# Patient Record
Sex: MALE | Race: WHITE | HISPANIC OR LATINO | Employment: FULL TIME | URBAN - METROPOLITAN AREA
[De-identification: names, ages, dates, MRNs, and addresses within clinical notes are randomized per-mention and may not be internally consistent; named-entity substitution may affect disease eponyms.]

---

## 2021-04-14 ENCOUNTER — OFFICE VISIT (OUTPATIENT)
Dept: URGENT CARE | Facility: PHYSICIAN GROUP | Age: 41
End: 2021-04-14

## 2021-04-14 VITALS
WEIGHT: 186 LBS | HEART RATE: 96 BPM | OXYGEN SATURATION: 98 % | BODY MASS INDEX: 29.89 KG/M2 | RESPIRATION RATE: 16 BRPM | DIASTOLIC BLOOD PRESSURE: 72 MMHG | HEIGHT: 66 IN | SYSTOLIC BLOOD PRESSURE: 130 MMHG | TEMPERATURE: 97.1 F

## 2021-04-14 DIAGNOSIS — L02.419 AXILLARY ABSCESS: ICD-10-CM

## 2021-04-14 PROCEDURE — 10061 I&D ABSCESS COMP/MULTIPLE: CPT | Performed by: NURSE PRACTITIONER

## 2021-04-14 RX ORDER — SULFAMETHOXAZOLE AND TRIMETHOPRIM 800; 160 MG/1; MG/1
1 TABLET ORAL 2 TIMES DAILY
Qty: 20 TABLET | Refills: 0 | Status: SHIPPED | OUTPATIENT
Start: 2021-04-14 | End: 2021-04-24

## 2021-04-14 NOTE — PROGRESS NOTES
Subjective:      Sukhdeep Duque AngelFung is a 40 y.o. male who presents with Bump (3 lumps in R under arm x3days)    History reviewed. No pertinent past medical history.  Social History     Socioeconomic History   • Marital status:      Spouse name: Not on file   • Number of children: Not on file   • Years of education: Not on file   • Highest education level: Not on file   Occupational History   • Not on file   Tobacco Use   • Smoking status: Never Smoker   • Smokeless tobacco: Never Used   Substance and Sexual Activity   • Alcohol use: Yes     Comment: occ   • Drug use: Not on file   • Sexual activity: Not on file   Other Topics Concern   • Not on file   Social History Narrative   • Not on file     Social Determinants of Health     Financial Resource Strain:    • Difficulty of Paying Living Expenses:    Food Insecurity:    • Worried About Running Out of Food in the Last Year:    • Ran Out of Food in the Last Year:    Transportation Needs:    • Lack of Transportation (Medical):    • Lack of Transportation (Non-Medical):    Physical Activity:    • Days of Exercise per Week:    • Minutes of Exercise per Session:    Stress:    • Feeling of Stress :    Social Connections:    • Frequency of Communication with Friends and Family:    • Frequency of Social Gatherings with Friends and Family:    • Attends Church Services:    • Active Member of Clubs or Organizations:    • Attends Club or Organization Meetings:    • Marital Status:    Intimate Partner Violence:    • Fear of Current or Ex-Partner:    • Emotionally Abused:    • Physically Abused:    • Sexually Abused:      History reviewed. No pertinent family history.    Allergies: Patient has no known allergies.    Patient is a 40-year-old male who presents today with complaint of 3 large painful lumps in the right axilla.  Patient states he sometimes gets small infected hair follicles, however this time he noticed a larger area and states he and his wife have  "tried to pop the area and expectorate pus last night.  He states they were able to get some drainage from it.  He is also noted to other painful masses.  He states these are painful enough to keep him from sleeping at night.  He states no fever, aches, or chills.  States he has not had a history of developing abscesses previously.        Other  This is a new problem. The current episode started in the past 7 days. The problem occurs constantly. The problem has been unchanged. Nothing aggravates the symptoms. He has tried nothing for the symptoms. The treatment provided no relief.       Review of Systems   All other systems reviewed and are negative.         Objective:     /72   Pulse 96   Temp 36.2 °C (97.1 °F) (Temporal)   Resp 16   Ht 1.676 m (5' 6\")   Wt 84.4 kg (186 lb)   SpO2 98%   BMI 30.02 kg/m²      Physical Exam  Vitals reviewed.   Constitutional:       Appearance: Normal appearance.   Cardiovascular:      Rate and Rhythm: Normal rate and regular rhythm.      Pulses: Normal pulses.      Heart sounds: Normal heart sounds.   Pulmonary:      Effort: Pulmonary effort is normal.      Breath sounds: Normal breath sounds.   Musculoskeletal:      Cervical back: Normal range of motion and neck supple.   Skin:     General: Skin is warm and dry.      Capillary Refill: Capillary refill takes less than 2 seconds.      Comments: There are 3 separate and distinct areas of induration in the right axilla, each measuring 3 cm in diameter.  Mild fluctuance palpable to each.   Neurological:      Mental Status: He is alert and oriented to person, place, and time.   Psychiatric:         Mood and Affect: Mood normal.         Behavior: Behavior normal.         Thought Content: Thought content normal.         Judgment: Judgment normal.       Procedure:    All 3 areas were cleaned with Betadine and then infiltrated with 2% lidocaine, 1.5 mL to each.  Good anesthesia achieved.  An 11 blade scalpel was used to make a 2 cm " incision across the top of each abscess.  Moderate amount of purulent drainage was expectorated from each.  Each wound was probed with sterile hemostats to break up any loculations and each was irrigated with sterile saline.  Each abscess was then packed with sterile iodoform ribbon.  Bleeding was controlled throughout.  Dressing placed over the top of the area with a large nonstick dressing with gauze and surgical tape over the top.  Patient was advised to return to clinic in 2 days for recheck, sooner if dressing becomes soiled.          Assessment/Plan:   Abscess x3, right axilla    Keep dressing intact, clean, and dry  Return in 24 to 48 hours for dressing change and wound repack  Bactrim DS called to patient's pharmacy  Ibuprofen as needed for pain  Return to urgent care otherwise for any further questions or concerns       There are no diagnoses linked to this encounter.

## 2021-04-16 ENCOUNTER — OFFICE VISIT (OUTPATIENT)
Dept: URGENT CARE | Facility: PHYSICIAN GROUP | Age: 41
End: 2021-04-16

## 2021-04-16 VITALS
WEIGHT: 185 LBS | DIASTOLIC BLOOD PRESSURE: 50 MMHG | HEART RATE: 80 BPM | TEMPERATURE: 97.7 F | RESPIRATION RATE: 16 BRPM | OXYGEN SATURATION: 98 % | BODY MASS INDEX: 29.03 KG/M2 | SYSTOLIC BLOOD PRESSURE: 118 MMHG | HEIGHT: 67 IN

## 2021-04-16 DIAGNOSIS — L02.419 AXILLARY ABSCESS: ICD-10-CM

## 2021-04-16 PROCEDURE — 99024 POSTOP FOLLOW-UP VISIT: CPT | Performed by: NURSE PRACTITIONER

## 2021-04-16 ASSESSMENT — ENCOUNTER SYMPTOMS
FEVER: 0
MUSCULOSKELETAL NEGATIVE: 1
CONSTITUTIONAL NEGATIVE: 1
NEUROLOGICAL NEGATIVE: 1
CHILLS: 0

## 2021-04-16 ASSESSMENT — VISUAL ACUITY: OU: 1

## 2021-04-19 ENCOUNTER — OFFICE VISIT (OUTPATIENT)
Dept: URGENT CARE | Facility: PHYSICIAN GROUP | Age: 41
End: 2021-04-19

## 2021-04-19 VITALS
RESPIRATION RATE: 16 BRPM | WEIGHT: 185 LBS | TEMPERATURE: 98.1 F | SYSTOLIC BLOOD PRESSURE: 104 MMHG | OXYGEN SATURATION: 99 % | HEIGHT: 67 IN | DIASTOLIC BLOOD PRESSURE: 62 MMHG | BODY MASS INDEX: 29.03 KG/M2 | HEART RATE: 77 BPM

## 2021-04-19 DIAGNOSIS — L02.419 AXILLARY ABSCESS: ICD-10-CM

## 2021-04-19 PROCEDURE — 99024 POSTOP FOLLOW-UP VISIT: CPT | Performed by: PHYSICIAN ASSISTANT

## 2021-04-19 NOTE — PROGRESS NOTES
HPI:   Presents for wound check 3 days post I&D. Wound without pain or redness, minimal discharge noted. Dressing in place.  Patient reports packing had fallen out.  Has been keeping clean.    ROS:   no fever, no sensory loss, no weakness    Exam:   Gen: WDWN in no distress  Wound: well healing wound. No evidence of dehiscence or infection.  Neuro: sensory/motor intact     A/P   Abscess I&D wound check  F/U prn  Based on the fact there were 3 resolving cysts in close proximity in right axilla will place general surgeon referral in the event patient wants them definitively removed or incomplete treatment      DRESSING CHANGE:   Dressing and packing removed with trace drainage   Wound irrigated with NS   Dressing applied   Pt tolerated procedure well

## 2021-08-05 ENCOUNTER — HOSPITAL ENCOUNTER (OUTPATIENT)
Dept: RADIOLOGY | Facility: MEDICAL CENTER | Age: 41
End: 2021-08-05
Attending: FAMILY MEDICINE
Payer: COMMERCIAL

## 2021-08-05 DIAGNOSIS — M54.16 LUMBAR RADICULOPATHY: ICD-10-CM

## 2021-08-05 PROCEDURE — 72148 MRI LUMBAR SPINE W/O DYE: CPT

## 2024-05-10 NOTE — PROGRESS NOTES
"Subjective:     Sukhdeep Bazzi is a 40 y.o. male who presents for Wound Check (remove packing )       Wound Check  There has been bloody (Improving) discharge from the wound. The redness has improved. The swelling has improved. The pain has improved. He has no difficulty moving the affected extremity or digit.     Seen in  4/14/2021 with complaints of painful bumps underneath his right arm.  Abscess was identified and an incision and drainage was performed.  Packing was placed.  Patient was started on Bactrim DS.    Patient has been taking the antibiotic as directed.  Symptoms have been improving.  Has been performing routine wound care.    Patient was screened prior to rooming and denied COVID-19 diagnosis or contact with a person who has been diagnosed or is suspected to have COVID-19. During this visit, appropriate PPE was worn, hand hygiene was performed, and the patient and any visitors were masked.     PMH:  has no past medical history on file.    MEDS:   Current Outpatient Medications:   •  sulfamethoxazole-trimethoprim (BACTRIM DS) 800-160 MG tablet, Take 1 tablet by mouth 2 times a day for 10 days., Disp: 20 tablet, Rfl: 0    ALLERGIES: No Known Allergies    SURGHX: History reviewed. No pertinent surgical history.    SOCHX:  reports that he has never smoked. He has never used smokeless tobacco. He reports current alcohol use.     FH: Reviewed with patient, not pertinent to this visit.    Review of Systems   Constitutional: Negative.  Negative for chills, fever and malaise/fatigue.   Musculoskeletal: Negative.    Skin:        Right axilla S/P I&D of abscess with packing in place   Neurological: Negative.    All other systems reviewed and are negative.    Additional details per HPI.      Objective:     /50   Pulse 80   Temp 36.5 °C (97.7 °F) (Temporal)   Resp 16   Ht 1.702 m (5' 7\")   Wt 83.9 kg (185 lb)   SpO2 98%   BMI 28.98 kg/m²     Physical Exam  Vitals reviewed. "   Constitutional:       General: He is not in acute distress.     Appearance: He is well-developed. He is not ill-appearing or toxic-appearing.   HENT:      Head: Normocephalic.      Right Ear: External ear normal.      Left Ear: External ear normal.   Eyes:      General: Vision grossly intact.      Extraocular Movements: Extraocular movements intact.      Conjunctiva/sclera: Conjunctivae normal.   Cardiovascular:      Rate and Rhythm: Normal rate.   Pulmonary:      Effort: Pulmonary effort is normal. No respiratory distress.   Musculoskeletal:         General: No deformity. Normal range of motion.      Cervical back: Normal range of motion.   Skin:     General: Skin is warm and dry.      Coloration: Skin is not pale.      Findings: No erythema.      Comments: Right axilla with 3 small incisions from previous I&D proedure, packing in place, mild tenderness, swelling, and blood, no erythema, fluctuance, or pus   Neurological:      Mental Status: He is alert and oriented to person, place, and time.      Sensory: No sensory deficit.      Motor: No weakness.      Coordination: Coordination normal.   Psychiatric:         Behavior: Behavior normal. Behavior is cooperative.       Assessment/Plan:     1. Axillary abscess    Wound care performed.  Old packing removed.  Irrigated site copiously with NS.  New quarter inch iodoform packing placed.  Nonstick dressing applied.    Symptoms improving.  Advised to continue with wound care.  Continue with antibiotic.  May take over-the-counter ibuprofen and apply cool compresses as needed for pain/inflammation.  Follow-up in 2 days for wound check.    Differential diagnosis, natural history, supportive care, over-the-counter symptom management per 's instructions, close monitoring, and indications for immediate follow-up discussed.     Patient advised to: Return for 1) Symptoms that worsen/don't improve, or go to ER, 2) Follow up with primary care in 7-10 days.    All  67M with CKD IV. questions answered. Patient agrees with the plan of care.